# Patient Record
(demographics unavailable — no encounter records)

---

## 2025-02-25 NOTE — COUNSELING
[Breast Self Exam] : breast self exam [Contraception/ Emergency Contraception/ Safe Sexual Practices] : contraception, emergency contraception, safe sexual practices [Preconception Care/ Fertility options] : preconception care, fertility options [Confidentiality] : confidentiality [STD (testing, results, tx)] : STD (testing, results, tx)

## 2025-02-25 NOTE — PLAN
[FreeTextEntry1] : Recommended prenatal vitamins, track cycles off birth control once pt is ready to try to conceive.

## 2025-02-25 NOTE — HISTORY OF PRESENT ILLNESS
[FreeTextEntry1] : Pt is a 31-year-old who presents today for well woman exam. LMP: 2/21/2025 POB: denies PGYN: regular, remote hx of chlamydia, denies abl pap  PMH: anxiety PSH: denies Med: per MAR All: NKDA; latex SH: social ETOH FH: denies  Pt is interested in conceiving in 1-2 years.

## 2025-02-25 NOTE — PHYSICAL EXAM
[Chaperone Present] : A chaperone was present in the examining room during all aspects of the physical examination [Appropriately responsive] : appropriately responsive [Alert] : alert [No Acute Distress] : no acute distress [Soft] : soft [Non-tender] : non-tender [Non-distended] : non-distended [No HSM] : No HSM [No Lesions] : no lesions [No Mass] : no mass [Oriented x3] : oriented x3 [Examination Of The Breasts] : a normal appearance [No Masses] : no breast masses were palpable [Labia Majora] : normal [Labia Minora] : normal [Normal] : normal [Uterine Adnexae] : normal [FreeTextEntry2] : CAROLINE Feliz  [Erosion] : erosions

## 2025-02-25 NOTE — DISCUSSION/SUMMARY
[FreeTextEntry1] : I spent the time noted on the day of this patient encounter preparing for, providing and documenting the above service. I have  counseled and educated the patient on the differential, workup, disease course, and treatment/management plan. Education was provided to the patient during this encounter. All questions and concerns were answered and addressed in detail.  Nisreen Contreras NP, FNP-BC